# Patient Record
Sex: FEMALE | Race: WHITE | NOT HISPANIC OR LATINO | Employment: OTHER | URBAN - METROPOLITAN AREA
[De-identification: names, ages, dates, MRNs, and addresses within clinical notes are randomized per-mention and may not be internally consistent; named-entity substitution may affect disease eponyms.]

---

## 2019-01-30 ENCOUNTER — OFFICE VISIT (OUTPATIENT)
Dept: OBSTETRICS AND GYNECOLOGY | Age: 69
End: 2019-01-30

## 2019-01-30 VITALS
HEIGHT: 67 IN | BODY MASS INDEX: 34.06 KG/M2 | WEIGHT: 217 LBS | DIASTOLIC BLOOD PRESSURE: 78 MMHG | SYSTOLIC BLOOD PRESSURE: 130 MMHG

## 2019-01-30 DIAGNOSIS — R87.619 ABNORMAL CYTOLOGICAL FINDING IN SPECIMEN FROM CERVIX UTERI: ICD-10-CM

## 2019-01-30 DIAGNOSIS — L98.9 SKIN LESION: Primary | ICD-10-CM

## 2019-01-30 DIAGNOSIS — Z98.890 HISTORY OF CONIZATION OF CERVIX: ICD-10-CM

## 2019-01-30 PROCEDURE — 11102 TANGNTL BX SKIN SINGLE LES: CPT | Performed by: OBSTETRICS & GYNECOLOGY

## 2019-01-30 PROCEDURE — G0101 CA SCREEN;PELVIC/BREAST EXAM: HCPCS | Performed by: OBSTETRICS & GYNECOLOGY

## 2019-01-30 RX ORDER — ATENOLOL 100 MG/1
100 TABLET ORAL DAILY
COMMUNITY

## 2019-01-30 RX ORDER — ERGOCALCIFEROL (VITAMIN D2) 10 MCG
400 TABLET ORAL DAILY
COMMUNITY

## 2019-01-30 RX ORDER — LISINOPRIL 10 MG/1
10 TABLET ORAL DAILY
COMMUNITY

## 2019-01-30 RX ORDER — VERAPAMIL HYDROCHLORIDE 40 MG/1
40 TABLET ORAL 3 TIMES DAILY
COMMUNITY

## 2019-01-30 RX ORDER — SIMVASTATIN 10 MG
10 TABLET ORAL NIGHTLY
COMMUNITY

## 2019-01-30 NOTE — PROGRESS NOTES
Routine Annual Visit    2019    Patient: Emily Chávez          MR#:[id number]      Chief Complaint   Patient presents with   • Gynecologic Exam     New pt. last AE 10 yrs ago per pt       History of Present Illness    68 y.o. female  who presents for annual exam. She has not had a pap or gyn exam for at least ten  Years. Has a hx of conization in  and states had normal paps afterwards but none for quite some years. Menopausal since around age 51. Denies bleeding since then. Denies irritation, discharge, not sexually active. Notes that she has a skin lesion of the mons that is sometimes itchy and irritating.     Health Maintenance  Gardasil no  Last pap at least 10 yrs prior  Mammogram in november  Colonoscopy + cologuard, colonoscopy in 2019 with polyp removed  PCP Dr. Robles    No LMP recorded. Patient is postmenopausal.  Obstetric History:  OB History      Para Term  AB Living    1 1 1     1    SAB TAB Ectopic Molar Multiple Live Births                        Menstrual History:     No LMP recorded. Patient is postmenopausal.       Sexual History:   not active currently    ________________________________________  Patient Active Problem List   Diagnosis   • Skin lesion       Past Medical History:   Diagnosis Date   • Chronic renal insufficiency    • COPD (chronic obstructive pulmonary disease) (CMS/HCC)    • COPD, severe (CMS/HCC)    • Depression    • Diabetes (CMS/HCC)    • GERD (gastroesophageal reflux disease)    • History of gastroesophageal reflux (GERD)    • Hyperlipemia    • Hyperlipidemia    • Hypertension    • KOLBY (obstructive sleep apnea)    • Osteoarthritis    • Tinea corporis    • Urinary frequency    • Vitamin D deficiency        Past Surgical History:   Procedure Laterality Date   • BACK SURGERY     • CERVICAL CONIZATION     • COLONOSCOPY     • TUBAL ABDOMINAL LIGATION     • WISDOM TOOTH EXTRACTION         Social History     Tobacco Use   Smoking Status Former Smoker  "  • Packs/day: 2.00   • Years: 41.00   • Pack years: 82.00   • Types: Cigarettes   Smokeless Tobacco Never Used       has a current medication list which includes the following prescription(s): albuterol, atenolol, famotidine, fluticasone furoate-vilanterol, lisinopril, simvastatin, umeclidinium bromide, verapamil, and vitamin d (cholecalciferol).  ________________________________________    Current contraception: post menopausal status  History of abnormal Pap smear: yes - hx conization 1982  Family history of Breast cancer: no  Family history of uterine or ovarian cancer: no  Family History of colon cancer/colon polyps: no  History of abnormal mammogram: no      The following portions of the patient's history were reviewed and updated as appropriate: allergies, current medications, past family history, past medical history, past social history, past surgical history and problem list.    Review of Systems    A comprehensive review of systems was negative except for: shortness of breath associated with COPD     Objective   Physical Exam    /78   Ht 170.2 cm (67\")   Wt 98.4 kg (217 lb)   BMI 33.99 kg/m²    BP Readings from Last 3 Encounters:   01/30/19 130/78      Wt Readings from Last 3 Encounters:   01/30/19 98.4 kg (217 lb)         BMI: Body mass index is 33.99 kg/m².       General:   alert, appears stated age and cooperative   Heart:: regular rate and rhythm, S1, S2 normal, no murmur, click, rub or gallop   Lungs: Distant breath sounds   Abdomen: soft, non-tender, without masses or organomegaly and obese   Breast: inspection negative, no nipple discharge or bleeding, no masses or nodularity palpable   Urethra and bladder: urethral meatus normal; bladder nontender to palpation;   Vulva: normal, on mons near the right groin has a flesh colored plaque with some evidence of excoriation, irregular edges   Vagina: normal mucosa, normal discharge, atrophic   Cervix: no lesions and nulliparous appearance "   Uterus: normal size or anteverted   Adnexa: normal adnexa and no mass, fullness, tenderness       Assessment:    abnormal annual exam- skin lesion biopsied today      Plan:    Plan     []  Mammogram request made- request report  [x]  PAP done- though older than 65 has not had adequate screening in past 20 years. Performed today  []  Labs:   []  GC/Chl/TV  []  DEXA scan   []  Referral for colonoscopy:     Problems Addressed this Visit        Musculoskeletal and Integument    Skin lesion - Primary     Flesh colored plaque of the mons near the left groin. Biopsied today. May be seborrheic keratosis, possibly condyloma.         Relevant Orders    Reference Histopathology      Other Visit Diagnoses     History of conization of cervix        Relevant Orders    PapIG, HPV, Rfx 16 / 18    Abnormal cytological finding in specimen from cervix uteri         Relevant Orders    PapIG, HPV, Rfx 16 / 18          Counseling    [x]  Nutrition  [x]  Physical activity/regular exercise   [x]  Healthy weight  []  Injury prevention  []  Smoking cessation  []  Substance misuse/abuse  [x]  Sexual behavior  []  STD prevention  []  Contraception  []  Dental health  []  Mental health  []  Immunization  []  Encouraged SBE        Natasha Thomas MD  01/30/2019  1:48 PM

## 2019-01-30 NOTE — ASSESSMENT & PLAN NOTE
Flesh colored plaque of the mons near the left groin. Biopsied today. May be seborrheic keratosis, possibly condyloma.

## 2019-02-01 LAB
CYTOLOGIST CVX/VAG CYTO: NORMAL
CYTOLOGY CVX/VAG DOC THIN PREP: NORMAL
DX ICD CODE: NORMAL
HIV 1 & 2 AB SER-IMP: NORMAL
HPV I/H RISK 1 DNA CVX QL PROBE+SIG AMP: NEGATIVE
Lab: NORMAL
OTHER STN SPEC: NORMAL
PATH REPORT.FINAL DX SPEC: NORMAL
PATH REPORT.FINAL DX SPEC: NORMAL
PATH REPORT.GROSS SPEC: NORMAL
PATH REPORT.SITE OF ORIGIN SPEC: NORMAL
PATHOLOGIST NAME: NORMAL
PAYMENT PROCEDURE: NORMAL
STAT OF ADQ CVX/VAG CYTO-IMP: NORMAL

## 2019-02-12 ENCOUNTER — TELEPHONE (OUTPATIENT)
Dept: OBSTETRICS AND GYNECOLOGY | Age: 69
End: 2019-02-12

## 2019-02-12 NOTE — TELEPHONE ENCOUNTER
Pt called back, notified her of normal pap and growth is warty, not CHEN or cancer. Will monitor it at annual visits    Natasha Thomas MD  2/12/2019  10:16 AM

## 2022-01-12 NOTE — PROGRESS NOTES
Informed pt that cxr was normal.   Procedure   Procedures      SKIN LESION BIOPSY PROCEDURE NOTE   Emily Chávez was noted to have an abnormal skin finding today during annual exam and biopsy was performed.    No LMP recorded. Patient is postmenopausal. She agreed to biopsy after discussing risks and benefits.      The area of the lesion in the left groin was cleansed with isopropyl alcohol. The area was injected with 2 mL 1% lidocaine. A raised area of the lesion was grapsed with forceps and sharply incised with scalpel. Pressure was applied to the area and silver nitrate very applied to bleeding bed in very small amount. Hemostasis obtained. The area was coated with antibiotic ointment.    T  The procedure was well tolerated.     Natasha Thomas MD  1/30/2019  1:55 PM